# Patient Record
Sex: FEMALE | Race: WHITE | NOT HISPANIC OR LATINO
[De-identification: names, ages, dates, MRNs, and addresses within clinical notes are randomized per-mention and may not be internally consistent; named-entity substitution may affect disease eponyms.]

---

## 2021-07-19 ENCOUNTER — TRANSCRIPTION ENCOUNTER (OUTPATIENT)
Age: 51
End: 2021-07-19

## 2021-12-01 ENCOUNTER — NON-APPOINTMENT (OUTPATIENT)
Age: 51
End: 2021-12-01

## 2021-12-01 DIAGNOSIS — Z83.3 FAMILY HISTORY OF DIABETES MELLITUS: ICD-10-CM

## 2021-12-01 DIAGNOSIS — Z78.9 OTHER SPECIFIED HEALTH STATUS: ICD-10-CM

## 2021-12-01 DIAGNOSIS — Z83.438 FAMILY HISTORY OF OTHER DISORDER OF LIPOPROTEIN METABOLISM AND OTHER LIPIDEMIA: ICD-10-CM

## 2021-12-01 DIAGNOSIS — Z80.0 FAMILY HISTORY OF MALIGNANT NEOPLASM OF DIGESTIVE ORGANS: ICD-10-CM

## 2021-12-01 RX ORDER — PNV NO.95/FERROUS FUM/FOLIC AC 28MG-0.8MG
TABLET ORAL
Refills: 0 | Status: ACTIVE | COMMUNITY

## 2021-12-02 ENCOUNTER — APPOINTMENT (OUTPATIENT)
Dept: INTERNAL MEDICINE | Facility: CLINIC | Age: 51
End: 2021-12-02
Payer: COMMERCIAL

## 2021-12-02 ENCOUNTER — NON-APPOINTMENT (OUTPATIENT)
Age: 51
End: 2021-12-02

## 2021-12-02 ENCOUNTER — TRANSCRIPTION ENCOUNTER (OUTPATIENT)
Age: 51
End: 2021-12-02

## 2021-12-02 VITALS
DIASTOLIC BLOOD PRESSURE: 82 MMHG | HEART RATE: 76 BPM | BODY MASS INDEX: 25.69 KG/M2 | SYSTOLIC BLOOD PRESSURE: 123 MMHG | OXYGEN SATURATION: 96 % | RESPIRATION RATE: 17 BRPM | HEIGHT: 63 IN | TEMPERATURE: 97.9 F | WEIGHT: 145 LBS

## 2021-12-02 DIAGNOSIS — Z23 ENCOUNTER FOR IMMUNIZATION: ICD-10-CM

## 2021-12-02 DIAGNOSIS — Z82.0 FAMILY HISTORY OF EPILEPSY AND OTHER DISEASES OF THE NERVOUS SYSTEM: ICD-10-CM

## 2021-12-02 DIAGNOSIS — Z80.41 FAMILY HISTORY OF MALIGNANT NEOPLASM OF OVARY: ICD-10-CM

## 2021-12-02 LAB
25(OH)D3 SERPL-MCNC: 18.6 NG/ML
ALBUMIN SERPL ELPH-MCNC: 4.4 G/DL
ALP BLD-CCNC: 65 U/L
ALT SERPL-CCNC: 16 U/L
ANION GAP SERPL CALC-SCNC: 11 MMOL/L
AST SERPL-CCNC: 19 U/L
BASOPHILS # BLD AUTO: 0.05 K/UL
BASOPHILS NFR BLD AUTO: 0.9 %
BILIRUB SERPL-MCNC: 0.3 MG/DL
BUN SERPL-MCNC: 14 MG/DL
CALCIUM SERPL-MCNC: 9.2 MG/DL
CHLORIDE SERPL-SCNC: 103 MMOL/L
CHOLEST SERPL-MCNC: 270 MG/DL
CO2 SERPL-SCNC: 24 MMOL/L
CREAT SERPL-MCNC: 0.85 MG/DL
EOSINOPHIL # BLD AUTO: 0.21 K/UL
EOSINOPHIL NFR BLD AUTO: 3.8 %
ESTIMATED AVERAGE GLUCOSE: 105 MG/DL
FERRITIN SERPL-MCNC: 22 NG/ML
GLUCOSE SERPL-MCNC: 82 MG/DL
HBA1C MFR BLD HPLC: 5.3 %
HCT VFR BLD CALC: 41.4 %
HDLC SERPL-MCNC: 74 MG/DL
HGB BLD-MCNC: 13.6 G/DL
IMM GRANULOCYTES NFR BLD AUTO: 0.4 %
IRON SATN MFR SERPL: 20 %
IRON SERPL-MCNC: 79 UG/DL
LDLC SERPL CALC-MCNC: 169 MG/DL
LYMPHOCYTES # BLD AUTO: 1.81 K/UL
LYMPHOCYTES NFR BLD AUTO: 32.4 %
MAN DIFF?: NORMAL
MCHC RBC-ENTMCNC: 31.9 PG
MCHC RBC-ENTMCNC: 32.9 GM/DL
MCV RBC AUTO: 97.2 FL
MONOCYTES # BLD AUTO: 0.42 K/UL
MONOCYTES NFR BLD AUTO: 7.5 %
NEUTROPHILS # BLD AUTO: 3.07 K/UL
NEUTROPHILS NFR BLD AUTO: 55 %
NONHDLC SERPL-MCNC: 195 MG/DL
PLATELET # BLD AUTO: 264 K/UL
POTASSIUM SERPL-SCNC: 4 MMOL/L
PROT SERPL-MCNC: 7 G/DL
RBC # BLD: 4.26 M/UL
RBC # FLD: 12.7 %
SODIUM SERPL-SCNC: 138 MMOL/L
TIBC SERPL-MCNC: 393 UG/DL
TRIGL SERPL-MCNC: 133 MG/DL
TSH SERPL-ACNC: 1.42 UIU/ML
UIBC SERPL-MCNC: 314 UG/DL
WBC # FLD AUTO: 5.58 K/UL

## 2021-12-02 PROCEDURE — G0442 ANNUAL ALCOHOL SCREEN 15 MIN: CPT

## 2021-12-02 PROCEDURE — 99205 OFFICE O/P NEW HI 60 MIN: CPT | Mod: 25

## 2021-12-02 PROCEDURE — 90471 IMMUNIZATION ADMIN: CPT

## 2021-12-02 PROCEDURE — 93000 ELECTROCARDIOGRAM COMPLETE: CPT | Mod: 59

## 2021-12-02 PROCEDURE — 90715 TDAP VACCINE 7 YRS/> IM: CPT

## 2021-12-02 RX ORDER — ZOSTER VACCINE RECOMBINANT, ADJUVANTED 50 MCG/0.5
50 KIT INTRAMUSCULAR
Qty: 1 | Refills: 1 | Status: ACTIVE | COMMUNITY
Start: 2021-12-02 | End: 1900-01-01

## 2021-12-08 ENCOUNTER — NON-APPOINTMENT (OUTPATIENT)
Age: 51
End: 2021-12-08

## 2021-12-09 ENCOUNTER — APPOINTMENT (OUTPATIENT)
Dept: INTERNAL MEDICINE | Facility: CLINIC | Age: 51
End: 2021-12-09
Payer: COMMERCIAL

## 2021-12-09 DIAGNOSIS — Z76.89 PERSONS ENCOUNTERING HEALTH SERVICES IN OTHER SPECIFIED CIRCUMSTANCES: ICD-10-CM

## 2021-12-09 PROCEDURE — 99214 OFFICE O/P EST MOD 30 MIN: CPT | Mod: 95

## 2021-12-10 ENCOUNTER — APPOINTMENT (OUTPATIENT)
Dept: ULTRASOUND IMAGING | Facility: CLINIC | Age: 51
End: 2021-12-10
Payer: COMMERCIAL

## 2021-12-10 ENCOUNTER — OUTPATIENT (OUTPATIENT)
Dept: OUTPATIENT SERVICES | Facility: HOSPITAL | Age: 51
LOS: 1 days | End: 2021-12-10
Payer: COMMERCIAL

## 2021-12-10 DIAGNOSIS — Z12.39 ENCOUNTER FOR OTHER SCREENING FOR MALIGNANT NEOPLASM OF BREAST: ICD-10-CM

## 2021-12-10 PROCEDURE — 76830 TRANSVAGINAL US NON-OB: CPT | Mod: 26

## 2021-12-10 PROCEDURE — 76830 TRANSVAGINAL US NON-OB: CPT

## 2021-12-10 PROCEDURE — 76856 US EXAM PELVIC COMPLETE: CPT

## 2021-12-10 PROCEDURE — 76856 US EXAM PELVIC COMPLETE: CPT | Mod: 26

## 2021-12-13 ENCOUNTER — APPOINTMENT (OUTPATIENT)
Dept: CARDIOLOGY | Facility: CLINIC | Age: 51
End: 2021-12-13
Payer: COMMERCIAL

## 2021-12-13 ENCOUNTER — NON-APPOINTMENT (OUTPATIENT)
Age: 51
End: 2021-12-13

## 2021-12-13 VITALS
HEART RATE: 81 BPM | SYSTOLIC BLOOD PRESSURE: 138 MMHG | DIASTOLIC BLOOD PRESSURE: 90 MMHG | BODY MASS INDEX: 24.8 KG/M2 | WEIGHT: 140 LBS | HEIGHT: 63 IN | OXYGEN SATURATION: 98 %

## 2021-12-13 VITALS — TEMPERATURE: 98 F

## 2021-12-13 DIAGNOSIS — R03.0 ELEVATED BLOOD-PRESSURE READING, W/OUT DIAGNOSIS OF HYPERTENSION: ICD-10-CM

## 2021-12-13 PROCEDURE — 99203 OFFICE O/P NEW LOW 30 MIN: CPT

## 2021-12-13 NOTE — HISTORY OF PRESENT ILLNESS
[FreeTextEntry1] : SIOBHAN MALIK is a 50 year old female referred for cardiology consult regarding abn ECG.\par She feels well, exercises regularly without symptoms or limitations.\par Recent labs reviewed and notable for high cholesterol.\par

## 2021-12-13 NOTE — REVIEW OF SYSTEMS
[Negative] : Heme/Lymph [FreeTextEntry9] : + chronic left leg larger than right, neg venous duplex remotely.

## 2021-12-13 NOTE — DISCUSSION/SUMMARY
[FreeTextEntry1] : Reassurance, with normal physical exam and no symptoms no additional work up necessary at this time for incomplete RBBB.\par Advised diet and exercise, low salt diet, follow up 3 mo BP check and repeat lipid profile.\par

## 2022-02-03 ENCOUNTER — RESULT REVIEW (OUTPATIENT)
Age: 52
End: 2022-02-03

## 2022-02-23 ENCOUNTER — APPOINTMENT (OUTPATIENT)
Dept: DERMATOLOGY | Facility: CLINIC | Age: 52
End: 2022-02-23

## 2022-03-10 ENCOUNTER — RX RENEWAL (OUTPATIENT)
Age: 52
End: 2022-03-10

## 2022-06-10 ENCOUNTER — RX RENEWAL (OUTPATIENT)
Age: 52
End: 2022-06-10

## 2022-06-10 RX ORDER — ERGOCALCIFEROL 1.25 MG/1
1.25 MG CAPSULE, LIQUID FILLED ORAL
Qty: 12 | Refills: 0 | Status: ACTIVE | COMMUNITY
Start: 2021-12-09 | End: 1900-01-01

## 2022-06-27 ENCOUNTER — NON-APPOINTMENT (OUTPATIENT)
Age: 52
End: 2022-06-27

## 2022-12-29 ENCOUNTER — NON-APPOINTMENT (OUTPATIENT)
Age: 52
End: 2022-12-29

## 2023-02-15 ENCOUNTER — NON-APPOINTMENT (OUTPATIENT)
Age: 53
End: 2023-02-15

## 2023-07-14 ENCOUNTER — APPOINTMENT (OUTPATIENT)
Dept: INTERNAL MEDICINE | Facility: CLINIC | Age: 53
End: 2023-07-14

## 2023-09-26 ENCOUNTER — NON-APPOINTMENT (OUTPATIENT)
Age: 53
End: 2023-09-26

## 2023-10-06 ENCOUNTER — APPOINTMENT (OUTPATIENT)
Dept: INTERNAL MEDICINE | Facility: CLINIC | Age: 53
End: 2023-10-06

## 2024-02-15 ENCOUNTER — APPOINTMENT (OUTPATIENT)
Dept: INTERNAL MEDICINE | Facility: CLINIC | Age: 54
End: 2024-02-15
Payer: COMMERCIAL

## 2024-02-15 ENCOUNTER — NON-APPOINTMENT (OUTPATIENT)
Age: 54
End: 2024-02-15

## 2024-02-15 VITALS
WEIGHT: 150 LBS | RESPIRATION RATE: 17 BRPM | BODY MASS INDEX: 26.58 KG/M2 | HEIGHT: 63 IN | OXYGEN SATURATION: 98 % | TEMPERATURE: 97.8 F | SYSTOLIC BLOOD PRESSURE: 136 MMHG | DIASTOLIC BLOOD PRESSURE: 86 MMHG | HEART RATE: 67 BPM

## 2024-02-15 DIAGNOSIS — Z13.0 ENCOUNTER FOR SCREENING FOR DISEASES OF THE BLOOD AND BLOOD-FORMING ORGANS AND CERTAIN DISORDERS INVOLVING THE IMMUNE MECHANISM: ICD-10-CM

## 2024-02-15 DIAGNOSIS — R94.31 ABNORMAL ELECTROCARDIOGRAM [ECG] [EKG]: ICD-10-CM

## 2024-02-15 DIAGNOSIS — Z87.42 PERSONAL HISTORY OF OTHER DISEASES OF THE FEMALE GENITAL TRACT: ICD-10-CM

## 2024-02-15 DIAGNOSIS — Z12.4 ENCOUNTER FOR SCREENING FOR MALIGNANT NEOPLASM OF CERVIX: ICD-10-CM

## 2024-02-15 DIAGNOSIS — Z12.39 ENCOUNTER FOR OTHER SCREENING FOR MALIGNANT NEOPLASM OF BREAST: ICD-10-CM

## 2024-02-15 DIAGNOSIS — I45.10 UNSPECIFIED RIGHT BUNDLE-BRANCH BLOCK: ICD-10-CM

## 2024-02-15 DIAGNOSIS — Z12.11 ENCOUNTER FOR SCREENING FOR MALIGNANT NEOPLASM OF COLON: ICD-10-CM

## 2024-02-15 DIAGNOSIS — Z00.00 ENCOUNTER FOR GENERAL ADULT MEDICAL EXAMINATION W/OUT ABNORMAL FINDINGS: ICD-10-CM

## 2024-02-15 DIAGNOSIS — E78.5 HYPERLIPIDEMIA, UNSPECIFIED: ICD-10-CM

## 2024-02-15 DIAGNOSIS — E55.9 VITAMIN D DEFICIENCY, UNSPECIFIED: ICD-10-CM

## 2024-02-15 DIAGNOSIS — Z13.29 ENCOUNTER FOR SCREENING FOR OTHER SUSPECTED ENDOCRINE DISORDER: ICD-10-CM

## 2024-02-15 DIAGNOSIS — R92.30 DENSE BREASTS, UNSPECIFIED: ICD-10-CM

## 2024-02-15 DIAGNOSIS — R92.1 MAMMOGRAPHIC CALCIFICATION FOUND ON DIAGNOSTIC IMAGING OF BREAST: ICD-10-CM

## 2024-02-15 DIAGNOSIS — Z12.83 ENCOUNTER FOR SCREENING FOR MALIGNANT NEOPLASM OF SKIN: ICD-10-CM

## 2024-02-15 PROCEDURE — 99396 PREV VISIT EST AGE 40-64: CPT | Mod: 25

## 2024-02-15 PROCEDURE — 93000 ELECTROCARDIOGRAM COMPLETE: CPT

## 2024-02-16 LAB
25(OH)D3 SERPL-MCNC: 22.7 NG/ML
ALBUMIN SERPL ELPH-MCNC: 4.5 G/DL
ALP BLD-CCNC: 78 U/L
ALT SERPL-CCNC: 22 U/L
ANION GAP SERPL CALC-SCNC: 15 MMOL/L
APPEARANCE: CLEAR
AST SERPL-CCNC: 27 U/L
BASOPHILS # BLD AUTO: 0.05 K/UL
BASOPHILS NFR BLD AUTO: 0.7 %
BILIRUB SERPL-MCNC: 0.6 MG/DL
BILIRUBIN URINE: NEGATIVE
BLOOD URINE: NEGATIVE
BUN SERPL-MCNC: 11 MG/DL
CALCIUM SERPL-MCNC: 9.8 MG/DL
CHLORIDE SERPL-SCNC: 101 MMOL/L
CHOLEST SERPL-MCNC: 323 MG/DL
CO2 SERPL-SCNC: 25 MMOL/L
COLOR: YELLOW
CREAT SERPL-MCNC: 0.82 MG/DL
EGFR: 85 ML/MIN/1.73M2
EOSINOPHIL # BLD AUTO: 0.19 K/UL
EOSINOPHIL NFR BLD AUTO: 2.7 %
ESTIMATED AVERAGE GLUCOSE: 105 MG/DL
GLUCOSE QUALITATIVE U: NEGATIVE MG/DL
GLUCOSE SERPL-MCNC: 84 MG/DL
HBA1C MFR BLD HPLC: 5.3 %
HCT VFR BLD CALC: 44.9 %
HDLC SERPL-MCNC: 68 MG/DL
HGB BLD-MCNC: 15.1 G/DL
IMM GRANULOCYTES NFR BLD AUTO: 0.1 %
KETONES URINE: NEGATIVE MG/DL
LDLC SERPL CALC-MCNC: 220 MG/DL
LEUKOCYTE ESTERASE URINE: NEGATIVE
LYMPHOCYTES # BLD AUTO: 2.38 K/UL
LYMPHOCYTES NFR BLD AUTO: 33.4 %
MAN DIFF?: NORMAL
MCHC RBC-ENTMCNC: 31.6 PG
MCHC RBC-ENTMCNC: 33.6 GM/DL
MCV RBC AUTO: 93.9 FL
MONOCYTES # BLD AUTO: 0.42 K/UL
MONOCYTES NFR BLD AUTO: 5.9 %
NEUTROPHILS # BLD AUTO: 4.07 K/UL
NEUTROPHILS NFR BLD AUTO: 57.2 %
NITRITE URINE: NEGATIVE
NONHDLC SERPL-MCNC: 255 MG/DL
PH URINE: 6.5
PLATELET # BLD AUTO: 306 K/UL
POTASSIUM SERPL-SCNC: 3.8 MMOL/L
PROT SERPL-MCNC: 7.6 G/DL
PROTEIN URINE: NEGATIVE MG/DL
RBC # BLD: 4.78 M/UL
RBC # FLD: 12.7 %
SODIUM SERPL-SCNC: 141 MMOL/L
SPECIFIC GRAVITY URINE: 1
TRIGL SERPL-MCNC: 184 MG/DL
TSH SERPL-ACNC: 1.07 UIU/ML
UROBILINOGEN URINE: 0.2 MG/DL
WBC # FLD AUTO: 7.12 K/UL

## 2024-05-16 RX ORDER — ROSUVASTATIN CALCIUM 10 MG/1
10 TABLET, FILM COATED ORAL
Qty: 90 | Refills: 0 | Status: ACTIVE | COMMUNITY
Start: 2024-02-16 | End: 1900-01-01

## 2024-12-05 ENCOUNTER — NON-APPOINTMENT (OUTPATIENT)
Age: 54
End: 2024-12-05